# Patient Record
Sex: MALE | Race: BLACK OR AFRICAN AMERICAN | NOT HISPANIC OR LATINO
[De-identification: names, ages, dates, MRNs, and addresses within clinical notes are randomized per-mention and may not be internally consistent; named-entity substitution may affect disease eponyms.]

---

## 2023-03-06 ENCOUNTER — TRANSCRIBE ORDERS (OUTPATIENT)
Dept: OTHER | Age: 23
End: 2023-03-06

## 2023-03-06 DIAGNOSIS — K59.09 OTHER CONSTIPATION: ICD-10-CM

## 2023-03-06 DIAGNOSIS — K21.9 GASTROESOPHAGEAL REFLUX DISEASE, UNSPECIFIED WHETHER ESOPHAGITIS PRESENT: ICD-10-CM

## 2023-03-06 DIAGNOSIS — Z86.19 HISTORY OF HELICOBACTER PYLORI INFECTION: ICD-10-CM

## 2023-03-06 DIAGNOSIS — R10.9 ABDOMINAL CRAMPING: ICD-10-CM

## 2023-03-06 DIAGNOSIS — R10.13 EPIGASTRIC PAIN: Primary | ICD-10-CM

## 2023-03-07 ENCOUNTER — TELEPHONE (OUTPATIENT)
Dept: GASTROENTEROLOGY | Facility: CLINIC | Age: 23
End: 2023-03-07

## 2023-03-07 NOTE — TELEPHONE ENCOUNTER
Called and left message for Pt. Called Pt back to complete demographics information in Pt's chart, along with insurance information. Writer left call back number.

## 2023-03-07 NOTE — TELEPHONE ENCOUNTER
[facility] Call Center    Phone Message    May a detailed message be left on voicemail: yes     Reason for Call: Other: Pt has been scheduled for urgent appt per triage notes. TE being sent per guidelines due to being outside urgent time frame for preferred time and location.     Action Taken: Message routed to:  [facility] ([facility]): GI Referral Triage    Travel Screening: Not Applicable

## 2023-03-07 NOTE — TELEPHONE ENCOUNTER
Pt called back and complete chart registration with Writer. Per Pt, Pt currently has no health insurance.

## 2023-03-08 ENCOUNTER — VIRTUAL VISIT (OUTPATIENT)
Dept: GASTROENTEROLOGY | Facility: CLINIC | Age: 23
End: 2023-03-08
Attending: NURSE PRACTITIONER

## 2023-03-08 ENCOUNTER — PRE VISIT (OUTPATIENT)
Dept: GASTROENTEROLOGY | Facility: CLINIC | Age: 23
End: 2023-03-08

## 2023-03-08 VITALS — WEIGHT: 190 LBS

## 2023-03-08 DIAGNOSIS — R63.4 UNINTENDED WEIGHT LOSS: Primary | ICD-10-CM

## 2023-03-08 DIAGNOSIS — Z86.19 HISTORY OF HELICOBACTER PYLORI INFECTION: ICD-10-CM

## 2023-03-08 DIAGNOSIS — R10.9 ABDOMINAL CRAMPING: ICD-10-CM

## 2023-03-08 DIAGNOSIS — R10.13 EPIGASTRIC PAIN: ICD-10-CM

## 2023-03-08 DIAGNOSIS — K21.9 GASTROESOPHAGEAL REFLUX DISEASE, UNSPECIFIED WHETHER ESOPHAGITIS PRESENT: ICD-10-CM

## 2023-03-08 DIAGNOSIS — K59.09 OTHER CONSTIPATION: ICD-10-CM

## 2023-03-08 PROCEDURE — 99203 OFFICE O/P NEW LOW 30 MIN: CPT | Mod: VID | Performed by: INTERNAL MEDICINE

## 2023-03-08 ASSESSMENT — PAIN SCALES - GENERAL: PAINLEVEL: NO PAIN (0)

## 2023-03-08 NOTE — TELEPHONE ENCOUNTER
REFERRAL INFORMATION:    Referring Provider:  Dipika Kamara NP    Referring Clinic:  [facility]    Reason for Visit/Diagnosis: Epigastric pain, abd pain, GERD     FUTURE VISIT INFORMATION:    Appointment Date: 03-08-23    Appointment Time: @ 2pm     NOTES STATUS DETAILS   OFFICE NOTE from Referring Provider Care everywhere 03-06-23, 03-03-23, 12-21-22, 11-28-22, 09-18-22 Dipika Kamara NP    OFFICE NOTE from Other Specialist N/A    HOSPITAL DISCHARGE SUMMARY/  ED VISITS N/A    OPERATIVE REPORT N/A    MEDICATION LIST N/A         ENDOSCOPY  N/A    COLONOSCOPY N/A    ERCP N/A    EUS N/A    STOOL TESTING Care everywhere 11-25-22, 08-31-22   PERTINENT LABS N/A    PATHOLOGY REPORTS (RELATED) N/A    IMAGING (CT, MRI, EGD, MRCP, Small Bowel Follow Through/SBT, MR/CT Enterography) N/A

## 2023-03-08 NOTE — NURSING NOTE
Is the patient currently in the state of [place]? YES    Visit mode:VIDEO    If the visit is dropped, the patient can be reconnected by: VIDEO VISIT: Text to cell phone: [phone]    Will anyone else be joining the visit? NO      How would you like to obtain your AVS? MyChart    Are changes needed to the allergy or medication list? NO    Reason for visit: Establishing care    Juan Deleon

## 2023-03-08 NOTE — PROGRESS NOTES
Video-Visit Details    Type of service:  Video Visit    Video Start Time (time video started): 2:10    Video End Time (time video stopped): 2:30    Originating Location (pt. Location): Other work    Distant Location (provider location):  Off-site    Mode of Communication:  Video Conference via [facility]      GASTROENTEROLOGY NEW PATIENT VIDEO VISIT    CC/REFERRING MD:    Dipika Kamara    REASON FOR CONSULTATION:   Dipika Kamara for   Chief Complaint   Patient presents with     Video Visit       HISTORY OF PRESENT ILLNESS:    Christ Martinez is a 23 year old male who is being evaluated via a billable video visit.      The patient is at work, walking around trying to find a space to have the video appointment.    He has had stomach problems since October 2022.  dx'd with H pylori and treated.    Still has pain upper abdomen, feels it is constant.  One week ago prescribed PPI qdaily, has been taking this for a week.    Wanted to lose weight, lost 30 lbs over a year, was surprised at how much weight he lost.    Med hx/  Rashes in childhood    fam hx/  No CRC  No gastric ca    Social/  + tob, +mj    I have reviewed and updated the patient's Past Medical History, Social History, Family History and Medication List.    PERTINENT PAST MEDICAL HISTORY:  (I personally reviewed this history with the patient at today's visit)   No past medical history on file.      PREVIOUS SURGERIES: (I personally reviewed this history with the patient at today's visit)   No past surgical history on file.    ALLERGIES:   No Known Allergies    PERTINENT MEDICATIONS:    Current Outpatient Medications:      omeprazole (PRILOSEC) 20 MG DR capsule, Take 20 mg by mouth, Disp: , Rfl:     SOCIAL HISTORY:  Social History     Occupational History     Not on file   Tobacco Use     Smoking status: Every Day     Packs/day: 0.50     Types: Cigarettes     Start date: 2023     Smokeless tobacco: Never   Substance and  Sexual Activity     Alcohol use: Not on file     Drug use: Not on file     Sexual activity: Not on file       FAMILY HISTORY:   No family history on file.     Review of Systems  Review of Systems   Constitutional: Positive for weight loss.   HENT: Negative.    Eyes: Positive for blurred vision.        Few episodes of blurred vision   Respiratory: Negative.    Cardiovascular: Negative.    Gastrointestinal: Positive for abdominal pain. Negative for constipation and diarrhea.   Genitourinary: Negative.    Musculoskeletal: Negative.    Skin: Positive for rash.        Has noticed dark spots between shoulders and neck   Neurological: Negative.    Endo/Heme/Allergies: Negative.    Psychiatric/Behavioral: Negative.         Exam:    General appearance: pleasant, in no acute distress  Eyes:  Sclera anicteric  Ears, nose, mouth and throat:  No obvious external lesions of ears and nose.  Hearing intact  Neck:  Symmetric, No obvious external lesions  Respiratory:  Normal respiration, no use of accessory muscles   MSK:  No visual upper extremity, neck or facial muscle atrophy  ABD:  No visual abdominal distention, no audible borborygmi  Skin:  No rashes or jaundice   Psychiatric:  Oriented to person, place and time, Appropriate mood and affect.   Neurologic:  Peripheral muscle function and dexterity appear to be intact        PERTINENT STUDIES have been reviewed.  UBT + in 8/22, neg 11/22      Impression/Recommendations:  Christ Martinez is a 23 year old male treated for H pylori (eradication confirmed by UBT 3 mo later 11/25/22)  last fall who presents for evaluation of abdominal pain and somewhat unintended weight loss, currently has been on PPI qdaily x 1 week.  ddx includes PUD vs SMA syndrome vs other.  -- EGD for eval  -- consider CT abdo afterwards  -- advised to make appt with ophthalmologist regarding episodes of blurred vision  -- RTC after EGD    Savi Kumar MD        Thank you for this consultation.  It was a  pleasure to participate in the care of this patient; please contact us with any further questions.      Time spent in appointment today was 20 minutes.

## 2023-03-20 ENCOUNTER — TELEPHONE (OUTPATIENT)
Dept: GASTROENTEROLOGY | Facility: CLINIC | Age: 23
End: 2023-03-20

## 2023-03-20 ASSESSMENT — ENCOUNTER SYMPTOMS
PSYCHIATRIC NEGATIVE: 1
CONSTIPATION: 0
MUSCULOSKELETAL NEGATIVE: 1
RESPIRATORY NEGATIVE: 1
BLURRED VISION: 1
NEUROLOGICAL NEGATIVE: 1
CARDIOVASCULAR NEGATIVE: 1
ABDOMINAL PAIN: 1
WEIGHT LOSS: 1
DIARRHEA: 0

## 2023-03-20 NOTE — TELEPHONE ENCOUNTER
Spoke to Christ.   Endoscopy scheduling number given, Christ verbalizes understanding , has no further questions.

## 2023-03-22 ENCOUNTER — TELEPHONE (OUTPATIENT)
Dept: GASTROENTEROLOGY | Facility: CLINIC | Age: 23
End: 2023-03-22

## 2023-03-22 NOTE — TELEPHONE ENCOUNTER
"Spoke to patient regarding GI clinic follow up. Patient prefers to wait to schedule follow up appt until he gets procedure scheduled.    Will remove follow up request due to patient will call to schedule    Schedule:  \"Return GI\" visit with Dr. Kumar or Gen GI provider (Petrona Feng, Steve Mantilla, Debra Chacon) for 2 mo follow up after EGD (around 5/20/23)  "